# Patient Record
Sex: MALE | Race: OTHER
[De-identification: names, ages, dates, MRNs, and addresses within clinical notes are randomized per-mention and may not be internally consistent; named-entity substitution may affect disease eponyms.]

---

## 2018-03-13 ENCOUNTER — HOSPITAL ENCOUNTER (OUTPATIENT)
Dept: HOSPITAL 74 - FASU | Age: 73
Discharge: HOME | End: 2018-03-13
Attending: OPHTHALMOLOGY
Payer: COMMERCIAL

## 2018-03-13 VITALS — BODY MASS INDEX: 22.6 KG/M2

## 2018-03-13 DIAGNOSIS — H25.21: Primary | ICD-10-CM

## 2018-03-13 PROCEDURE — 08RJ3JZ REPLACEMENT OF RIGHT LENS WITH SYNTHETIC SUBSTITUTE, PERCUTANEOUS APPROACH: ICD-10-PCS | Performed by: OPHTHALMOLOGY

## 2018-03-13 RX ADMIN — KETOROLAC TROMETHAMINE ONE DROP: 5 SOLUTION OPHTHALMIC at 10:40

## 2018-03-13 RX ADMIN — KETOROLAC TROMETHAMINE ONE DROP: 5 SOLUTION OPHTHALMIC at 10:45

## 2018-03-13 RX ADMIN — TROPICAMIDE ONE DROP: 10 SOLUTION/ DROPS OPHTHALMIC at 10:40

## 2018-03-13 RX ADMIN — GENTAMICIN SULFATE ONE DROP: 3 SOLUTION/ DROPS OPHTHALMIC at 11:00

## 2018-03-13 RX ADMIN — KETOROLAC TROMETHAMINE ONE DROP: 5 SOLUTION OPHTHALMIC at 11:00

## 2018-03-13 RX ADMIN — KETOROLAC TROMETHAMINE ONE DROP: 5 SOLUTION OPHTHALMIC at 10:55

## 2018-03-13 RX ADMIN — CYCLOPENTOLATE HYDROCHLORIDE ONE DROP: 10 SOLUTION/ DROPS OPHTHALMIC at 11:00

## 2018-03-13 RX ADMIN — TROPICAMIDE ONE DROP: 10 SOLUTION/ DROPS OPHTHALMIC at 10:55

## 2018-03-13 RX ADMIN — KETOROLAC TROMETHAMINE ONE DROP: 5 SOLUTION OPHTHALMIC at 10:50

## 2018-03-13 RX ADMIN — CYCLOPENTOLATE HYDROCHLORIDE ONE DROP: 10 SOLUTION/ DROPS OPHTHALMIC at 10:40

## 2018-03-13 RX ADMIN — CYCLOPENTOLATE HYDROCHLORIDE ONE DROP: 10 SOLUTION/ DROPS OPHTHALMIC at 10:45

## 2018-03-13 RX ADMIN — TROPICAMIDE ONE DROP: 10 SOLUTION/ DROPS OPHTHALMIC at 10:50

## 2018-03-13 RX ADMIN — TROPICAMIDE ONE DROP: 10 SOLUTION/ DROPS OPHTHALMIC at 10:45

## 2018-03-13 RX ADMIN — GENTAMICIN SULFATE ONE DROP: 3 SOLUTION/ DROPS OPHTHALMIC at 10:55

## 2018-03-13 RX ADMIN — PHENYLEPHRINE HYDROCHLORIDE ONE DROP: 0.25 SPRAY NASAL at 10:40

## 2018-03-13 RX ADMIN — PHENYLEPHRINE HYDROCHLORIDE ONE DROP: 0.25 SPRAY NASAL at 10:50

## 2018-03-13 RX ADMIN — CYCLOPENTOLATE HYDROCHLORIDE ONE DROP: 10 SOLUTION/ DROPS OPHTHALMIC at 10:55

## 2018-03-13 RX ADMIN — CYCLOPENTOLATE HYDROCHLORIDE ONE DROP: 10 SOLUTION/ DROPS OPHTHALMIC at 10:50

## 2018-03-13 RX ADMIN — PHENYLEPHRINE HYDROCHLORIDE ONE DROP: 0.25 SPRAY NASAL at 10:55

## 2018-03-13 RX ADMIN — PHENYLEPHRINE HYDROCHLORIDE ONE DROP: 0.25 SPRAY NASAL at 10:45

## 2018-03-13 RX ADMIN — GENTAMICIN SULFATE ONE DROP: 3 SOLUTION/ DROPS OPHTHALMIC at 10:45

## 2018-03-13 RX ADMIN — TROPICAMIDE ONE DROP: 10 SOLUTION/ DROPS OPHTHALMIC at 11:00

## 2018-03-13 RX ADMIN — PHENYLEPHRINE HYDROCHLORIDE ONE DROP: 0.25 SPRAY NASAL at 11:00

## 2018-03-13 RX ADMIN — GENTAMICIN SULFATE ONE DROP: 3 SOLUTION/ DROPS OPHTHALMIC at 10:50

## 2018-03-13 RX ADMIN — GENTAMICIN SULFATE ONE DROP: 3 SOLUTION/ DROPS OPHTHALMIC at 10:40

## 2018-03-13 NOTE — OP
DATE OF OPERATION:  03/13/2018

 

PREOPERATIVE DIAGNOSIS:  Hypermature cataract, right eye.

 

POSTOPERATIVE DIAGNOSIS:  Hypermature cataract, right eye.

 

PROCEDURE:  Hypermature cataract extraction via phacoemulsification with insertion of

posterior chamber lens implant, right eye with use of trypan blue.

 

SURGEON:  Raul Hager MD

 

ASSISTANT:  Jaqueline Delarosa MD

 

ANESTHESIA:  Regional with sedation.

 

ESTIMATED BLOOD LOSS:  Less than 1 mL.

 

COMPLICATIONS:  None.

 

SPECIMENS:  None.

 

DESCRIPTION OF PROCEDURE:  The patient was identified in the holding area.  After all

risks, benefits, and alternatives were explained to the patient, informed consent was

obtained.  The right eye was marked with a marking pen.  The patient then entered the

operating room on an eye stretcher.  After a formal timeout was performed, a 3-mL

injection of equal parts of 2% lidocaine with epinephrine and 0.5% Marcaine was given

around the right eye.  The right eye was then prepped and draped in the usual sterile

fashion.  An eyelid speculum was placed beneath the eyelids of the right eye.  A

superotemporal paracentesis incision was created using a 15-degree blade. 

Viscoelastic was injected after an air bubble was injected first.  Trypan blue was

then used to stain the anterior capsule underneath the air bubble.  Then, as

previously mentioned, viscoelastic was injected afterward to fill the anterior

chamber and to clear any excess trypan blue from the eye.  A 2.4-mm keratome blade

was then used to make an inferotemporal incision.  A 360-degree, continuous

curvilinear capsulorrhexis was then created using bent cystotome and Utrata forceps. 

A light hydrodissection was performed using balanced saline solution on a cannula. 

Phacoemulsification was introduced to disassemble and remove the nucleus in its

entirety.  Irrigation/aspiration was used to remove any remaining cortical material

from the eye.  The capsular bag was reformed using viscoelastic.  An Gustavo model

[id number] with a power of 22.0 diopters, serial number [id number] was inspected and

found to be defect free and injected into the capsular bag.  Irrigation/aspiration

was used to remove any remaining viscoelastic from the eye.  The anterior chamber was

reformed using balanced saline solution.  Intracameral Miochol and Miostat were then

administered, and the pupil came down and was round.  All wounds were hydrated with

balanced saline solution and noted to be watertight.  For complete closure,

interrupted 10-0 nylon sutures were placed at the inferior and superior wounds, and

the knots buried.  Topical antibiotic eye drops and ointment were then administered

after it was verified that the anterior chamber was deep, the eye had an adequate

pressure, the lens was perfectly centered in the capsular bag, and there was a red

reflex present.  The eyelid speculum was removed from the right eye.  The right eye

was then patched and shielded.  The patient tolerated the procedure well and left the

operating room in stable condition to follow up in the eye clinic tomorrow morning at

9:00.

 

 

RAUL HAGER M.D.

 

[id number]

DD: 03/13/2018 13:48

DT: 03/13/2018 16:31

Job #:  [id number]

## 2018-03-14 VITALS — DIASTOLIC BLOOD PRESSURE: 72 MMHG | HEART RATE: 61 BPM | SYSTOLIC BLOOD PRESSURE: 120 MMHG | TEMPERATURE: 97.9 F

## 2018-11-29 ENCOUNTER — RX RENEWAL (OUTPATIENT)
Age: 73
End: 2018-11-29

## 2018-11-29 PROBLEM — Z00.00 ENCOUNTER FOR PREVENTIVE HEALTH EXAMINATION: Status: ACTIVE | Noted: 2018-11-29

## 2018-12-05 ENCOUNTER — RX RENEWAL (OUTPATIENT)
Age: 73
End: 2018-12-05

## 2018-12-07 ENCOUNTER — RX RENEWAL (OUTPATIENT)
Age: 73
End: 2018-12-07

## 2019-01-23 ENCOUNTER — RECORD ABSTRACTING (OUTPATIENT)
Age: 74
End: 2019-01-23

## 2019-01-23 DIAGNOSIS — Z82.49 FAMILY HISTORY OF ISCHEMIC HEART DISEASE AND OTHER DISEASES OF THE CIRCULATORY SYSTEM: ICD-10-CM

## 2019-01-23 DIAGNOSIS — Z01.818 ENCOUNTER FOR OTHER PREPROCEDURAL EXAMINATION: ICD-10-CM

## 2019-01-23 RX ORDER — METFORMIN HYDROCHLORIDE 500 MG/1
500 TABLET, COATED ORAL
Refills: 0 | Status: ACTIVE | COMMUNITY

## 2019-01-23 RX ORDER — CHROMIUM 200 MCG
TABLET ORAL
Refills: 0 | Status: ACTIVE | COMMUNITY

## 2019-01-23 RX ORDER — ASPIRIN 325 MG/1
TABLET, FILM COATED ORAL
Refills: 0 | Status: ACTIVE | COMMUNITY

## 2019-01-25 ENCOUNTER — APPOINTMENT (OUTPATIENT)
Dept: CARDIOLOGY | Facility: CLINIC | Age: 74
End: 2019-01-25
Payer: COMMERCIAL

## 2019-01-25 ENCOUNTER — NON-APPOINTMENT (OUTPATIENT)
Age: 74
End: 2019-01-25

## 2019-01-25 VITALS
HEIGHT: 68 IN | WEIGHT: 146 LBS | DIASTOLIC BLOOD PRESSURE: 78 MMHG | BODY MASS INDEX: 22.13 KG/M2 | SYSTOLIC BLOOD PRESSURE: 156 MMHG

## 2019-01-25 DIAGNOSIS — R94.39 ABNORMAL RESULT OF OTHER CARDIOVASCULAR FUNCTION STUDY: ICD-10-CM

## 2019-01-25 DIAGNOSIS — M79.7 FIBROMYALGIA: ICD-10-CM

## 2019-01-25 PROCEDURE — 93000 ELECTROCARDIOGRAM COMPLETE: CPT

## 2019-01-25 PROCEDURE — 99214 OFFICE O/P EST MOD 30 MIN: CPT

## 2019-01-25 NOTE — REASON FOR VISIT
[FreeTextEntry1] : The patient is followed in a preventive mode with the principle diagnoses of hypertension and hyperlipidemia.

## 2019-01-25 NOTE — DISCUSSION/SUMMARY
[FreeTextEntry1] : The patient is on a preventive mode with numerous coronary risk factors including hypertension hyperlipidemia family history of coronary disease. The patient's lipid profile has not been optimal. However we have made numerous attempts to try various statins all of which cause severe muscle pain. We discussed today the possibility of any nodule lipid-lowering agent one of the PCSk9 inhibitors. I explained that these drugs are extremely expensive and they might not yet be approved for primary prevention. I plan to [facility] with [facility] next week to see if the patient might qualify. I also recommended that the patient begin a program of daily exercise which has been lacking. We discussed the current medications that he is receiving. I believe these are all appropriate. These will be continued. The patient indicates that his blood pressure is usually very well controlled. Lab tests are done regularly by his primary care physician. However I have not see the recent report. This is being requested presently. Explained her over to the patient that the lipid profile is not likely to change dramatically absent some pharmacological intervention. The patient recently underwent cataract surgery and tolerated that procedure without difficulty. He was also evaluated by a retinologist; he states that there was no evidence of diabetic retinopathy. Patient's most recent stress test was done in April 2016 which revealed borderline ST segment depression at maximum exercise. I plan to repeat a stress echo at the time of the patient's next visit.

## 2019-01-25 NOTE — HISTORY OF PRESENT ILLNESS
[FreeTextEntry1] : The patient states that he is feeling entirely well he offers no cardiac symptoms.\par \par No symptoms of chest pain shortness of breath dizziness lightheadedness or palpitations. He states that his blood pressure is usually under excellent control. He states that his diabetes is under control.

## 2019-01-25 NOTE — PHYSICAL EXAM
[General Appearance - Well Developed] : well developed [Normal Appearance] : normal appearance [Well Groomed] : well groomed [General Appearance - Well Nourished] : well nourished [No Deformities] : no deformities [General Appearance - In No Acute Distress] : no acute distress [Normal Conjunctiva] : the conjunctiva exhibited no abnormalities [Eyelids - No Xanthelasma] : the eyelids demonstrated no xanthelasmas [Normal Oral Mucosa] : normal oral mucosa [No Oral Pallor] : no oral pallor [No Oral Cyanosis] : no oral cyanosis [Normal Jugular Venous A Waves Present] : normal jugular venous A waves present [Normal Jugular Venous V Waves Present] : normal jugular venous V waves present [No Jugular Venous Song A Waves] : no jugular venous song A waves [Respiration, Rhythm And Depth] : normal respiratory rhythm and effort [Exaggerated Use Of Accessory Muscles For Inspiration] : no accessory muscle use [Auscultation Breath Sounds / Voice Sounds] : lungs were clear to auscultation bilaterally [Heart Rate And Rhythm] : heart rate and rhythm were normal [Heart Sounds] : normal S1 and S2 [Murmurs] : no murmurs present [Abdomen Soft] : soft [Abdomen Tenderness] : non-tender [Abdomen Mass (___ Cm)] : no abdominal mass palpated [Abnormal Walk] : normal gait [Gait - Sufficient For Exercise Testing] : the gait was sufficient for exercise testing [Nail Clubbing] : no clubbing of the fingernails [Cyanosis, Localized] : no localized cyanosis [Petechial Hemorrhages (___cm)] : no petechial hemorrhages [Skin Color & Pigmentation] : normal skin color and pigmentation [] : no rash [No Venous Stasis] : no venous stasis [Skin Lesions] : no skin lesions [No Skin Ulcers] : no skin ulcer [No Xanthoma] : no  xanthoma was observed [Oriented To Time, Place, And Person] : oriented to person, place, and time [Affect] : the affect was normal [Mood] : the mood was normal [No Anxiety] : not feeling anxious [FreeTextEntry1] : Send a slightly built male

## 2019-02-18 ENCOUNTER — RX RENEWAL (OUTPATIENT)
Age: 74
End: 2019-02-18

## 2019-05-22 ENCOUNTER — RX CHANGE (OUTPATIENT)
Age: 74
End: 2019-05-22

## 2019-07-03 ENCOUNTER — RX RENEWAL (OUTPATIENT)
Age: 74
End: 2019-07-03

## 2019-08-13 ENCOUNTER — APPOINTMENT (OUTPATIENT)
Dept: CARDIOLOGY | Facility: CLINIC | Age: 74
End: 2019-08-13
Payer: COMMERCIAL

## 2019-08-13 VITALS
HEIGHT: 68 IN | OXYGEN SATURATION: 99 % | HEART RATE: 62 BPM | BODY MASS INDEX: 21.37 KG/M2 | SYSTOLIC BLOOD PRESSURE: 136 MMHG | WEIGHT: 141 LBS | DIASTOLIC BLOOD PRESSURE: 60 MMHG

## 2019-08-13 PROCEDURE — 36415 COLL VENOUS BLD VENIPUNCTURE: CPT

## 2019-08-13 PROCEDURE — 99213 OFFICE O/P EST LOW 20 MIN: CPT

## 2019-08-13 NOTE — HISTORY OF PRESENT ILLNESS
[FreeTextEntry1] : 73 year old male with HTN, hyperlipidemia, statin intolerance, family history of premature CAD, diabetes, fibromyalgia. \par \evelyn Recently started on Praluent that he self-administers. 1st dose given on July 8th. \par \evelyn Returns today for follow up visit. \par \evelyn Reports no issues with Praluent. Denies chest pain, SOB, palpitations or dizziness.

## 2019-08-13 NOTE — REVIEW OF SYSTEMS
[Fever] : no fever [Recent Weight Gain (___ Lbs)] : no recent weight gain [Headache] : no headache [Chills] : no chills [Recent Weight Loss (___ Lbs)] : no recent weight loss [Feeling Fatigued] : not feeling fatigued [Shortness Of Breath] : no shortness of breath [Chest  Pressure] : no chest pressure [Dyspnea on exertion] : not dyspnea during exertion [Lower Ext Edema] : no extremity edema [Chest Pain] : no chest pain [Palpitations] : no palpitations [Cough] : no cough [Abdominal Pain] : no abdominal pain [Muscle Cramps] : no muscle cramps [Skin: A Rash] : no rash: [Confusion] : no confusion was observed [Dizziness] : no dizziness [Excessive Thirst] : no polydipsia [Easy Bleeding] : no tendency for easy bleeding [Easy Bruising] : no tendency for easy bruising

## 2019-08-13 NOTE — DISCUSSION/SUMMARY
[FreeTextEntry3] : as scheduled, with Dr. Mckee [FreeTextEntry1] : \par Continue current medications. \par \par Continue Praluent.

## 2019-08-13 NOTE — ASSESSMENT
[FreeTextEntry1] : \par Statin intolerant. Now on PCSK9 inhibitor Praluent. Tolerating medication. Repeat lipids, chemistry and CPK drawn today.

## 2019-08-13 NOTE — PHYSICAL EXAM
[General Appearance - In No Acute Distress] : no acute distress [Well Groomed] : well groomed [Respiration, Rhythm And Depth] : normal respiratory rhythm and effort [Heart Rate And Rhythm] : heart rate and rhythm were normal [Auscultation Breath Sounds / Voice Sounds] : lungs were clear to auscultation bilaterally [Heart Sounds] : normal S1 and S2 [Murmurs] : no murmurs present [Edema] : no peripheral edema present [Abdomen Soft] : soft [Abnormal Walk] : normal gait [] : no rash [Oriented To Time, Place, And Person] : oriented to person, place, and time [Impaired Insight] : insight and judgment were intact [Affect] : the affect was normal

## 2019-08-14 LAB
ALBUMIN SERPL ELPH-MCNC: 4.4 G/DL
ALP BLD-CCNC: 107 U/L
ALT SERPL-CCNC: 15 U/L
ANION GAP SERPL CALC-SCNC: 11 MMOL/L
AST SERPL-CCNC: 19 U/L
BILIRUB SERPL-MCNC: 0.3 MG/DL
BUN SERPL-MCNC: 17 MG/DL
CALCIUM SERPL-MCNC: 9.3 MG/DL
CHLORIDE SERPL-SCNC: 102 MMOL/L
CHOLEST SERPL-MCNC: 83 MG/DL
CHOLEST/HDLC SERPL: 2.7 RATIO
CK SERPL-CCNC: 98 U/L
CO2 SERPL-SCNC: 28 MMOL/L
CREAT SERPL-MCNC: 0.92 MG/DL
GLUCOSE SERPL-MCNC: 152 MG/DL
HDLC SERPL-MCNC: 31 MG/DL
LDLC SERPL CALC-MCNC: 4 MG/DL
POTASSIUM SERPL-SCNC: 4.8 MMOL/L
PROT SERPL-MCNC: 6.8 G/DL
SODIUM SERPL-SCNC: 141 MMOL/L
TRIGL SERPL-MCNC: 239 MG/DL

## 2019-09-30 ENCOUNTER — RX RENEWAL (OUTPATIENT)
Age: 74
End: 2019-09-30

## 2019-12-16 ENCOUNTER — RX CHANGE (OUTPATIENT)
Age: 74
End: 2019-12-16

## 2019-12-16 RX ORDER — ALIROCUMAB 75 MG/ML
75 INJECTION, SOLUTION SUBCUTANEOUS
Qty: 1 | Refills: 5 | Status: DISCONTINUED | COMMUNITY
Start: 2019-12-16 | End: 2019-12-16

## 2019-12-16 RX ORDER — ALIROCUMAB 75 MG/ML
75 INJECTION, SOLUTION SUBCUTANEOUS
Qty: 2 | Refills: 5 | Status: DISCONTINUED | COMMUNITY
Start: 2019-05-22 | End: 2019-12-16

## 2019-12-18 ENCOUNTER — RX CHANGE (OUTPATIENT)
Age: 74
End: 2019-12-18

## 2020-02-12 RX ORDER — EVOLOCUMAB 140 MG/ML
140 INJECTION, SOLUTION SUBCUTANEOUS
Qty: 2 | Refills: 5 | Status: DISCONTINUED | COMMUNITY
Start: 2019-12-16 | End: 2020-02-12

## 2020-03-04 ENCOUNTER — RX RENEWAL (OUTPATIENT)
Age: 75
End: 2020-03-04

## 2020-03-30 ENCOUNTER — APPOINTMENT (OUTPATIENT)
Dept: CARDIOLOGY | Facility: CLINIC | Age: 75
End: 2020-03-30
Payer: COMMERCIAL

## 2020-03-30 PROCEDURE — 99443: CPT

## 2020-11-23 ENCOUNTER — RX RENEWAL (OUTPATIENT)
Age: 75
End: 2020-11-23

## 2020-12-31 RX ORDER — ALIROCUMAB 75 MG/ML
75 INJECTION, SOLUTION SUBCUTANEOUS
Qty: 1 | Refills: 5 | Status: DISCONTINUED | COMMUNITY
Start: 2020-02-12 | End: 2020-12-31

## 2021-03-01 ENCOUNTER — RX RENEWAL (OUTPATIENT)
Age: 76
End: 2021-03-01

## 2021-03-02 ENCOUNTER — APPOINTMENT (OUTPATIENT)
Dept: CARDIOLOGY | Facility: CLINIC | Age: 76
End: 2021-03-02
Payer: COMMERCIAL

## 2021-03-02 ENCOUNTER — NON-APPOINTMENT (OUTPATIENT)
Age: 76
End: 2021-03-02

## 2021-03-02 VITALS
HEIGHT: 67 IN | SYSTOLIC BLOOD PRESSURE: 130 MMHG | BODY MASS INDEX: 23.07 KG/M2 | TEMPERATURE: 97.7 F | WEIGHT: 147 LBS | DIASTOLIC BLOOD PRESSURE: 60 MMHG

## 2021-03-02 PROCEDURE — 93000 ELECTROCARDIOGRAM COMPLETE: CPT

## 2021-03-02 PROCEDURE — 99214 OFFICE O/P EST MOD 30 MIN: CPT

## 2021-03-02 PROCEDURE — 99072 ADDL SUPL MATRL&STAF TM PHE: CPT

## 2021-03-02 NOTE — HISTORY OF PRESENT ILLNESS
[FreeTextEntry1] : The patient has had no symptoms of chest pain palpitations or unusual shortness of breath. Exercise however has been  following a prudent diet limited in sweets and simple carbohydrates.

## 2021-03-02 NOTE — REASON FOR VISIT
[FreeTextEntry1] : The patient is followed in a preventive mode with several important coronary risk factors including diabetes mellitus, hyperlipidemia and family history. We have been using a PCS k9  inhibitor now for her over a year. Patient was not able to tolerate statins at all but has done well with these agents which have had a very salutary effect on the serum lipid levels. Fortunately he was approved for this drug by his insurance company.

## 2021-03-02 NOTE — PHYSICAL EXAM
[General Appearance - Well Developed] : well developed [Normal Appearance] : normal appearance [Well Groomed] : well groomed [General Appearance - Well Nourished] : well nourished [No Deformities] : no deformities [General Appearance - In No Acute Distress] : no acute distress [Normal Conjunctiva] : the conjunctiva exhibited no abnormalities [Eyelids - No Xanthelasma] : the eyelids demonstrated no xanthelasmas [Normal Oral Mucosa] : normal oral mucosa [No Oral Pallor] : no oral pallor [No Oral Cyanosis] : no oral cyanosis [Normal Jugular Venous A Waves Present] : normal jugular venous A waves present [Normal Jugular Venous V Waves Present] : normal jugular venous V waves present [No Jugular Venous Song A Waves] : no jugular venous song A waves [Respiration, Rhythm And Depth] : normal respiratory rhythm and effort [Exaggerated Use Of Accessory Muscles For Inspiration] : no accessory muscle use [Auscultation Breath Sounds / Voice Sounds] : lungs were clear to auscultation bilaterally [Heart Rate And Rhythm] : heart rate and rhythm were normal [Heart Sounds] : normal S1 and S2 [Murmurs] : no murmurs present [Abdomen Soft] : soft [Abdomen Tenderness] : non-tender [Abdomen Mass (___ Cm)] : no abdominal mass palpated [Abnormal Walk] : normal gait [Gait - Sufficient For Exercise Testing] : the gait was sufficient for exercise testing [Nail Clubbing] : no clubbing of the fingernails [Cyanosis, Localized] : no localized cyanosis [Petechial Hemorrhages (___cm)] : no petechial hemorrhages [Skin Color & Pigmentation] : normal skin color and pigmentation [] : no rash [No Venous Stasis] : no venous stasis [Skin Lesions] : no skin lesions [No Skin Ulcers] : no skin ulcer [No Xanthoma] : no  xanthoma was observed [Oriented To Time, Place, And Person] : oriented to person, place, and time [Affect] : the affect was normal [Mood] : the mood was normal [No Anxiety] : not feeling anxious

## 2021-03-02 NOTE — DISCUSSION/SUMMARY
[FreeTextEntry1] : Patient is doing very well clinically. He will continue on aggressive risk factor modification including a PCSk9 nhibitor. The patient is also on treatment for diabetes and hypertension. His examination is entirely stable and satisfactory blood pressure 130/60 cardiorespiratory examination normal electrocardiogram normal. I believe the patient is on optimal preventive medications at the present time. I did recommend that he increase his program of exercise and also recommended light weight llifting for improvement of muscle tone. Plan to do treadmill testing next time.

## 2021-07-27 ENCOUNTER — RX RENEWAL (OUTPATIENT)
Age: 76
End: 2021-07-27

## 2021-09-07 ENCOUNTER — APPOINTMENT (OUTPATIENT)
Dept: CARDIOLOGY | Facility: CLINIC | Age: 76
End: 2021-09-07

## 2021-10-25 ENCOUNTER — NON-APPOINTMENT (OUTPATIENT)
Age: 76
End: 2021-10-25

## 2021-10-25 ENCOUNTER — APPOINTMENT (OUTPATIENT)
Dept: CARDIOLOGY | Facility: CLINIC | Age: 76
End: 2021-10-25
Payer: COMMERCIAL

## 2021-10-25 VITALS
SYSTOLIC BLOOD PRESSURE: 140 MMHG | HEIGHT: 67 IN | BODY MASS INDEX: 24.48 KG/M2 | TEMPERATURE: 98.1 F | DIASTOLIC BLOOD PRESSURE: 60 MMHG | HEART RATE: 61 BPM | WEIGHT: 156 LBS

## 2021-10-25 DIAGNOSIS — H34.219 PARTIAL RETINAL ARTERY OCCLUSION, UNSPECIFIED EYE: ICD-10-CM

## 2021-10-25 PROCEDURE — 99212 OFFICE O/P EST SF 10 MIN: CPT | Mod: 25

## 2021-10-25 PROCEDURE — 93015 CV STRESS TEST SUPVJ I&R: CPT

## 2021-10-25 RX ORDER — TADALAFIL 5 MG/1
5 TABLET ORAL
Qty: 30 | Refills: 0 | Status: ACTIVE | COMMUNITY
Start: 2021-08-05

## 2021-10-25 NOTE — REASON FOR VISIT
[FreeTextEntry1] : Patient returns today for stress testing and clinical followup. He is followed on a preventive mode with significant coronary risk factors including genetic predisposition diabetes mellitus and hyperlipidemia. The patient was approved based on his risk factors for the PCS k9 inhibitors and has been receiving PRALUENT which will now be switched over to REPATHA. The patient has had no acute cardiac symptoms. There have been no symptoms of chest pain shortness of breath dizziness lightheadedness or palpitations.The patient was intolerant of statins.

## 2021-10-25 NOTE — DISCUSSION/SUMMARY
[FreeTextEntry1] : The patient has had no acute cardiac symptoms. His baseline EKG is normal. Today's treadmill stress test revealed no evidence of exercise-induced myocardial ischemia or arrhythmias. The patient will be treated with aggressive lipid lowering therapy. He will remain on his other medications 2. I am recommending that he maintain a program of regular exercise and dietary modifications.

## 2021-10-25 NOTE — PHYSICAL EXAM

## 2021-12-28 ENCOUNTER — RX CHANGE (OUTPATIENT)
Age: 76
End: 2021-12-28

## 2021-12-30 ENCOUNTER — RX CHANGE (OUTPATIENT)
Age: 76
End: 2021-12-30

## 2022-01-03 RX ORDER — EVOLOCUMAB 140 MG/ML
140 INJECTION, SOLUTION SUBCUTANEOUS
Qty: 2 | Refills: 5 | Status: COMPLETED | COMMUNITY
Start: 2020-12-31 | End: 2022-01-03

## 2022-02-14 ENCOUNTER — RX RENEWAL (OUTPATIENT)
Age: 77
End: 2022-02-14

## 2022-03-23 ENCOUNTER — RX CHANGE (OUTPATIENT)
Age: 77
End: 2022-03-23

## 2022-03-23 RX ORDER — EVOLOCUMAB 140 MG/ML
140 INJECTION, SOLUTION SUBCUTANEOUS
Qty: 2 | Refills: 5 | Status: DISCONTINUED | COMMUNITY
Start: 2021-12-28 | End: 2022-03-23

## 2022-04-20 ENCOUNTER — RX CHANGE (OUTPATIENT)
Age: 77
End: 2022-04-20

## 2022-04-20 RX ORDER — EVOLOCUMAB 140 MG/ML
140 INJECTION, SOLUTION SUBCUTANEOUS
Qty: 6 | Refills: 2 | Status: DISCONTINUED | COMMUNITY
Start: 2022-03-23 | End: 2022-04-20

## 2022-08-15 ENCOUNTER — RX RENEWAL (OUTPATIENT)
Age: 77
End: 2022-08-15

## 2023-01-20 ENCOUNTER — NON-APPOINTMENT (OUTPATIENT)
Age: 78
End: 2023-01-20

## 2023-01-20 ENCOUNTER — APPOINTMENT (OUTPATIENT)
Dept: CARDIOLOGY | Facility: CLINIC | Age: 78
End: 2023-01-20
Payer: COMMERCIAL

## 2023-01-20 VITALS
WEIGHT: 142 LBS | BODY MASS INDEX: 22.29 KG/M2 | HEART RATE: 66 BPM | HEIGHT: 67 IN | TEMPERATURE: 99 F | DIASTOLIC BLOOD PRESSURE: 62 MMHG | SYSTOLIC BLOOD PRESSURE: 136 MMHG

## 2023-01-20 DIAGNOSIS — Z82.49 FAMILY HISTORY OF ISCHEMIC HEART DISEASE AND OTHER DISEASES OF THE CIRCULATORY SYSTEM: ICD-10-CM

## 2023-01-20 PROCEDURE — 93000 ELECTROCARDIOGRAM COMPLETE: CPT

## 2023-01-20 PROCEDURE — 99214 OFFICE O/P EST MOD 30 MIN: CPT | Mod: 25

## 2023-01-20 NOTE — REASON FOR VISIT
[FreeTextEntry1] : Patient is followed in a preventive mode with the principal diagnoses of diabetes mellitus, hypertension, family history of coronary artery disease and hyperlipidemia\par \par We have been treating his blood pressure with lisinopril he receives metformin for hyperglycemia also amlodipine for hypertension and he is receiving monthly injections of PCSK9 inhibitor Repatha in order to better control hyperlipidemia.\par \par Patient is doing extremely well clinically.  There have been no symptoms of chest pain shortness of breath dizziness lightheadedness or palpitations.  The patient is very active and has lost some weight over the past year.

## 2023-01-20 NOTE — DISCUSSION/SUMMARY
[FreeTextEntry1] : Patient continues to do extremely well clinically.  We are focusing on primary prevention in this case.  I am very pleased with his progress.  We were able to get approval for the Repatha in spite of the fact that this is primary prevention.\par \par Cardiorespiratory examination is normal the electrocardiograph is normal the patient's blood pressure is normal\par \par Labs were done today by his primary care physician and those will be reviewed.  No changes will be made in his medications at the present time\par \par The patient will be embarking shortly on a trip to his native [facility] for 1 month.

## 2023-01-24 ENCOUNTER — NON-APPOINTMENT (OUTPATIENT)
Age: 78
End: 2023-01-24

## 2023-01-31 ENCOUNTER — RX RENEWAL (OUTPATIENT)
Age: 78
End: 2023-01-31

## 2023-02-28 ENCOUNTER — RX CHANGE (OUTPATIENT)
Age: 78
End: 2023-02-28

## 2023-02-28 RX ORDER — EVOLOCUMAB 140 MG/ML
140 INJECTION, SOLUTION SUBCUTANEOUS
Qty: 6 | Refills: 4 | Status: ACTIVE | COMMUNITY
Start: 2023-02-28 | End: 1900-01-01

## 2023-04-21 ENCOUNTER — RX RENEWAL (OUTPATIENT)
Age: 78
End: 2023-04-21

## 2023-05-04 ENCOUNTER — RX RENEWAL (OUTPATIENT)
Age: 78
End: 2023-05-04

## 2023-05-08 ENCOUNTER — RX RENEWAL (OUTPATIENT)
Age: 78
End: 2023-05-08

## 2023-07-24 ENCOUNTER — RX RENEWAL (OUTPATIENT)
Age: 78
End: 2023-07-24

## 2023-07-24 RX ORDER — LISINOPRIL 20 MG/1
20 TABLET ORAL
Qty: 180 | Refills: 3 | Status: ACTIVE | COMMUNITY
Start: 2018-11-29 | End: 1900-01-01

## 2023-07-25 ENCOUNTER — APPOINTMENT (OUTPATIENT)
Dept: CARDIOLOGY | Facility: CLINIC | Age: 78
End: 2023-07-25

## 2023-10-31 ENCOUNTER — NON-APPOINTMENT (OUTPATIENT)
Age: 78
End: 2023-10-31

## 2023-10-31 ENCOUNTER — APPOINTMENT (OUTPATIENT)
Dept: CARDIOLOGY | Facility: CLINIC | Age: 78
End: 2023-10-31
Payer: COMMERCIAL

## 2023-10-31 VITALS
BODY MASS INDEX: 21.66 KG/M2 | WEIGHT: 138 LBS | SYSTOLIC BLOOD PRESSURE: 144 MMHG | HEART RATE: 61 BPM | DIASTOLIC BLOOD PRESSURE: 62 MMHG | OXYGEN SATURATION: 100 % | HEIGHT: 67 IN

## 2023-10-31 PROCEDURE — 36415 COLL VENOUS BLD VENIPUNCTURE: CPT

## 2023-10-31 PROCEDURE — 93000 ELECTROCARDIOGRAM COMPLETE: CPT

## 2023-10-31 PROCEDURE — 99214 OFFICE O/P EST MOD 30 MIN: CPT | Mod: 25

## 2023-11-01 LAB
ALBUMIN SERPL ELPH-MCNC: 4.4 G/DL
ALP BLD-CCNC: 95 U/L
ALT SERPL-CCNC: 18 U/L
ANION GAP SERPL CALC-SCNC: 10 MMOL/L
AST SERPL-CCNC: 20 U/L
BASOPHILS # BLD AUTO: 0.07 K/UL
BASOPHILS NFR BLD AUTO: 0.9 %
BILIRUB SERPL-MCNC: 0.6 MG/DL
BUN SERPL-MCNC: 22 MG/DL
CALCIUM SERPL-MCNC: 9.4 MG/DL
CHLORIDE SERPL-SCNC: 105 MMOL/L
CHOLEST SERPL-MCNC: 151 MG/DL
CO2 SERPL-SCNC: 27 MMOL/L
CREAT SERPL-MCNC: 1.04 MG/DL
EGFR: 75 ML/MIN/1.73M2
EOSINOPHIL # BLD AUTO: 0.45 K/UL
EOSINOPHIL NFR BLD AUTO: 5.9 %
ESTIMATED AVERAGE GLUCOSE: 140 MG/DL
GLUCOSE SERPL-MCNC: 119 MG/DL
HBA1C MFR BLD HPLC: 6.5 %
HCT VFR BLD CALC: 45.9 %
HDLC SERPL-MCNC: 35 MG/DL
HGB BLD-MCNC: 14.4 G/DL
IMM GRANULOCYTES NFR BLD AUTO: 0.3 %
LDLC SERPL CALC-MCNC: 89 MG/DL
LYMPHOCYTES # BLD AUTO: 1.49 K/UL
LYMPHOCYTES NFR BLD AUTO: 19.6 %
MAN DIFF?: NORMAL
MCHC RBC-ENTMCNC: 28.9 PG
MCHC RBC-ENTMCNC: 31.4 GM/DL
MCV RBC AUTO: 92 FL
MONOCYTES # BLD AUTO: 0.65 K/UL
MONOCYTES NFR BLD AUTO: 8.5 %
NEUTROPHILS # BLD AUTO: 4.94 K/UL
NEUTROPHILS NFR BLD AUTO: 64.8 %
NONHDLC SERPL-MCNC: 115 MG/DL
PLATELET # BLD AUTO: 237 K/UL
POTASSIUM SERPL-SCNC: 5.5 MMOL/L
PROT SERPL-MCNC: 6.8 G/DL
RBC # BLD: 4.99 M/UL
RBC # FLD: 13.5 %
SODIUM SERPL-SCNC: 141 MMOL/L
TRIGL SERPL-MCNC: 149 MG/DL
WBC # FLD AUTO: 7.62 K/UL

## 2023-12-26 ENCOUNTER — RX RENEWAL (OUTPATIENT)
Age: 78
End: 2023-12-26

## 2024-02-02 ENCOUNTER — RX RENEWAL (OUTPATIENT)
Age: 79
End: 2024-02-02

## 2024-02-02 RX ORDER — AMLODIPINE BESYLATE 5 MG/1
5 TABLET ORAL
Qty: 90 | Refills: 1 | Status: ACTIVE | COMMUNITY
Start: 2018-12-05 | End: 1900-01-01

## 2024-05-31 RX ORDER — EVOLOCUMAB 140 MG/ML
140 INJECTION, SOLUTION SUBCUTANEOUS
Qty: 6 | Refills: 3 | Status: ACTIVE | COMMUNITY
Start: 2022-04-20 | End: 1900-01-01

## 2024-06-28 ENCOUNTER — NON-APPOINTMENT (OUTPATIENT)
Age: 79
End: 2024-06-28

## 2024-06-28 ENCOUNTER — APPOINTMENT (OUTPATIENT)
Dept: HEART AND VASCULAR | Facility: CLINIC | Age: 79
End: 2024-06-28
Payer: COMMERCIAL

## 2024-06-28 VITALS
BODY MASS INDEX: 22.29 KG/M2 | WEIGHT: 142 LBS | SYSTOLIC BLOOD PRESSURE: 128 MMHG | DIASTOLIC BLOOD PRESSURE: 60 MMHG | HEART RATE: 67 BPM | OXYGEN SATURATION: 96 % | HEIGHT: 67 IN

## 2024-06-28 DIAGNOSIS — E11.9 TYPE 2 DIABETES MELLITUS W/OUT COMPLICATIONS: ICD-10-CM

## 2024-06-28 DIAGNOSIS — E78.5 HYPERLIPIDEMIA, UNSPECIFIED: ICD-10-CM

## 2024-06-28 DIAGNOSIS — I73.9 PERIPHERAL VASCULAR DISEASE, UNSPECIFIED: ICD-10-CM

## 2024-06-28 DIAGNOSIS — I10 ESSENTIAL (PRIMARY) HYPERTENSION: ICD-10-CM

## 2024-06-28 PROCEDURE — 93000 ELECTROCARDIOGRAM COMPLETE: CPT

## 2024-06-28 PROCEDURE — 99214 OFFICE O/P EST MOD 30 MIN: CPT | Mod: 25

## 2024-07-23 ENCOUNTER — RESULT REVIEW (OUTPATIENT)
Age: 79
End: 2024-07-23

## 2024-08-19 ENCOUNTER — RX RENEWAL (OUTPATIENT)
Age: 79
End: 2024-08-19

## 2024-09-19 ENCOUNTER — NON-APPOINTMENT (OUTPATIENT)
Age: 79
End: 2024-09-19

## 2024-09-20 ENCOUNTER — APPOINTMENT (OUTPATIENT)
Dept: HEART AND VASCULAR | Facility: CLINIC | Age: 79
End: 2024-09-20
Payer: COMMERCIAL

## 2024-09-20 ENCOUNTER — NON-APPOINTMENT (OUTPATIENT)
Age: 79
End: 2024-09-20

## 2024-09-20 VITALS
OXYGEN SATURATION: 99 % | SYSTOLIC BLOOD PRESSURE: 126 MMHG | BODY MASS INDEX: 22.17 KG/M2 | HEART RATE: 62 BPM | WEIGHT: 141.25 LBS | HEIGHT: 67 IN | DIASTOLIC BLOOD PRESSURE: 55 MMHG

## 2024-09-20 DIAGNOSIS — I73.9 PERIPHERAL VASCULAR DISEASE, UNSPECIFIED: ICD-10-CM

## 2024-09-20 DIAGNOSIS — E11.9 TYPE 2 DIABETES MELLITUS W/OUT COMPLICATIONS: ICD-10-CM

## 2024-09-20 DIAGNOSIS — E78.5 HYPERLIPIDEMIA, UNSPECIFIED: ICD-10-CM

## 2024-09-20 DIAGNOSIS — I10 ESSENTIAL (PRIMARY) HYPERTENSION: ICD-10-CM

## 2024-09-20 PROCEDURE — G2211 COMPLEX E/M VISIT ADD ON: CPT | Mod: NC

## 2024-09-20 PROCEDURE — 99214 OFFICE O/P EST MOD 30 MIN: CPT | Mod: 25

## 2024-09-20 PROCEDURE — 93000 ELECTROCARDIOGRAM COMPLETE: CPT

## 2024-09-20 NOTE — HISTORY OF PRESENT ILLNESS
[FreeTextEntry1] : 77 y/o M patient of Dr. Mckee here today to transition care. Hx of DM, dyslipidemia, and HTN. The patient has been on Repatha for primary prevention due to dyslipidemia and inability to tolerate statins.   Walks a lot, [place] city, no chest pain or sob.  Has some calf/thigh pain when he walks a lot of steps.  No dizziness/palps/swelling

## 2024-09-20 NOTE — CARDIOLOGY SUMMARY
[de-identified] : Echo 7/23/24: 1. Left ventricular cavity is small. Left ventricular wall thickness is normal. Left ventricular systolic function is normal with an ejection fraction of 69 % by Montano's method of disks. There are no regional wall motion abnormalities seen. 2. Normal left ventricular diastolic function, with normal left ventricular filling pressure. 3. Normal right ventricular cavity size, with normal wall thickness, and normal right ventricular systolic function. 4. The left atrium is mildly dilated. 5. No significant valvular disease. 6. Mild tricuspid regurgitation. 7. Estimated pulmonary artery systolic pressure is 31 mmHg, consistent with normal pulmonary artery pressure. 8. No pericardial effusion seen. [de-identified] : Exercise Stress Test 10/25/21: GUILLERMO for 8:10min;s, Max. METS: 8.80. 91% maphr. HR and BP appropriate response to exercise. No evidence of exercise induced ischemia to attained HR. [de-identified] : ILATERAL ANKLE-BRACHIAL INDEX, SEGMENTAL LIMB PRESSURES, AND PULSE VOLUME RECORDING 7/23/24: 1. Right: normal SHERYL. 2. Left: SHERYL indicates borderline lower extremity arterial disease. SHERYL                                                         TBI  --------------------------------- ------------------------  ------------------ Non-compressible (>1.4)           Mild disease (0.70 -      Normal (> 0.7) 0.90) Normal at rest (1.0 - 1.4)        Moderate (0.40 - 0.69)    Mild (0.5-0.7) Borderline abnormal at rest (0.91 Severe disease, critical  Moderate - 0.99)                           PAD (< 0.4 or less)       (0.35-0.5) Severe (< 0.35) -------------------------------------------------------------------------------- FINDINGS: Right Lower Extremity Arteries: Resting right ankle-brachial index is within normal range. No evidence of significant right lower extremity arterial disease. Left Lower Extremity Arteries: Resting right ankle-brachial index indicates borderline left lower extremity arterial disease.

## 2024-09-20 NOTE — ASSESSMENT
[FreeTextEntry1] : 78 M   Claudication Venous HTN  HLD  HTN - LDL 93  EKG sinus Exercise Stress Test 10/25/21: GUILLERMO for 8:10min;s, Max. METS: 8.80. 91% mphr. HR and BP appropriate response to exercise. No evidence of exercise induced ischemia to attained HR.  Prior [facility] records/imaging reviewed.  - leg cramping improved with compression therapy, likely venous HTN cause of symptoms - L SHERYL 0.92 is borderline reduced.  Rec LE art US to confirm anatomy given his need for daily compression therapy.  - TTE July 2024 - unremarkable - BP controlled - continue norvasc 5mg, lisinopril 20mg daily - continue repatha, he has been taking 140mg once a month.  LDL is 93.  Advised taking it q2 weeks as is recommended.  Intolerant of statins.   Repeat lipids in 3 months.   - continue aspirin, primary prevention.  No hx VTE, CVA, or MI.   - obtain treadmill stress echo given risk profile

## 2024-10-15 ENCOUNTER — APPOINTMENT (OUTPATIENT)
Dept: CARDIOLOGY | Facility: CLINIC | Age: 79
End: 2024-10-15
Payer: COMMERCIAL

## 2024-10-15 PROCEDURE — ZZZZZ: CPT

## 2024-10-15 PROCEDURE — 93351 STRESS TTE COMPLETE: CPT

## 2024-10-31 ENCOUNTER — APPOINTMENT (OUTPATIENT)
Dept: CARDIOLOGY | Facility: CLINIC | Age: 79
End: 2024-10-31

## 2024-11-07 ENCOUNTER — APPOINTMENT (OUTPATIENT)
Dept: CARDIOLOGY | Facility: CLINIC | Age: 79
End: 2024-11-07

## 2025-02-18 ENCOUNTER — NON-APPOINTMENT (OUTPATIENT)
Age: 80
End: 2025-02-18

## 2025-03-10 ENCOUNTER — APPOINTMENT (OUTPATIENT)
Dept: FAMILY MEDICINE | Facility: CLINIC | Age: 80
End: 2025-03-10

## 2025-03-13 ENCOUNTER — APPOINTMENT (OUTPATIENT)
Dept: CARDIOLOGY | Facility: CLINIC | Age: 80
End: 2025-03-13
Payer: COMMERCIAL

## 2025-03-13 ENCOUNTER — APPOINTMENT (OUTPATIENT)
Dept: HEART AND VASCULAR | Facility: CLINIC | Age: 80
End: 2025-03-13
Payer: COMMERCIAL

## 2025-03-13 VITALS
WEIGHT: 132 LBS | HEIGHT: 67 IN | SYSTOLIC BLOOD PRESSURE: 140 MMHG | BODY MASS INDEX: 20.72 KG/M2 | DIASTOLIC BLOOD PRESSURE: 62 MMHG

## 2025-03-13 DIAGNOSIS — E11.9 TYPE 2 DIABETES MELLITUS W/OUT COMPLICATIONS: ICD-10-CM

## 2025-03-13 DIAGNOSIS — R55 SYNCOPE AND COLLAPSE: ICD-10-CM

## 2025-03-13 DIAGNOSIS — I10 ESSENTIAL (PRIMARY) HYPERTENSION: ICD-10-CM

## 2025-03-13 DIAGNOSIS — E78.5 HYPERLIPIDEMIA, UNSPECIFIED: ICD-10-CM

## 2025-03-13 DIAGNOSIS — I73.9 PERIPHERAL VASCULAR DISEASE, UNSPECIFIED: ICD-10-CM

## 2025-03-13 PROCEDURE — 93246 EXT ECG>7D<15D RECORDING: CPT

## 2025-03-13 PROCEDURE — 93000 ELECTROCARDIOGRAM COMPLETE: CPT

## 2025-03-13 PROCEDURE — 99214 OFFICE O/P EST MOD 30 MIN: CPT

## 2025-03-13 PROCEDURE — 93925 LOWER EXTREMITY STUDY: CPT

## 2025-03-13 PROCEDURE — G2211 COMPLEX E/M VISIT ADD ON: CPT | Mod: NC

## 2025-03-13 PROCEDURE — 93000 ELECTROCARDIOGRAM COMPLETE: CPT | Mod: 59

## 2025-03-14 PROBLEM — R55 SYNCOPE: Status: ACTIVE | Noted: 2025-03-14

## 2025-03-14 PROBLEM — R55 SYNCOPE AND COLLAPSE: Status: ACTIVE | Noted: 2025-03-14

## 2025-03-21 ENCOUNTER — NON-APPOINTMENT (OUTPATIENT)
Age: 80
End: 2025-03-21

## 2025-04-02 ENCOUNTER — APPOINTMENT (OUTPATIENT)
Dept: GASTROENTEROLOGY | Facility: CLINIC | Age: 80
End: 2025-04-02

## 2025-04-11 PROCEDURE — 93248 EXT ECG>7D<15D REV&INTERPJ: CPT

## 2025-04-15 RX ORDER — AMLODIPINE BESYLATE 10 MG/1
10 TABLET ORAL DAILY
Qty: 90 | Refills: 3 | Status: ACTIVE | COMMUNITY
Start: 2025-04-14 | End: 1900-01-01

## 2025-04-22 ENCOUNTER — NON-APPOINTMENT (OUTPATIENT)
Age: 80
End: 2025-04-22

## 2025-04-22 ENCOUNTER — LABORATORY RESULT (OUTPATIENT)
Age: 80
End: 2025-04-22

## 2025-04-22 ENCOUNTER — APPOINTMENT (OUTPATIENT)
Dept: HEART AND VASCULAR | Facility: CLINIC | Age: 80
End: 2025-04-22

## 2025-04-22 VITALS
SYSTOLIC BLOOD PRESSURE: 130 MMHG | OXYGEN SATURATION: 100 % | BODY MASS INDEX: 20.83 KG/M2 | DIASTOLIC BLOOD PRESSURE: 60 MMHG | WEIGHT: 133 LBS | HEART RATE: 60 BPM

## 2025-04-22 DIAGNOSIS — I10 ESSENTIAL (PRIMARY) HYPERTENSION: ICD-10-CM

## 2025-04-22 DIAGNOSIS — R55 SYNCOPE AND COLLAPSE: ICD-10-CM

## 2025-04-22 DIAGNOSIS — I73.9 PERIPHERAL VASCULAR DISEASE, UNSPECIFIED: ICD-10-CM

## 2025-04-22 DIAGNOSIS — E78.5 HYPERLIPIDEMIA, UNSPECIFIED: ICD-10-CM

## 2025-04-22 PROCEDURE — 93000 ELECTROCARDIOGRAM COMPLETE: CPT

## 2025-04-22 PROCEDURE — 99214 OFFICE O/P EST MOD 30 MIN: CPT

## 2025-04-22 PROCEDURE — G2211 COMPLEX E/M VISIT ADD ON: CPT | Mod: NC

## 2025-04-23 LAB
ANION GAP SERPL CALC-SCNC: 19 MMOL/L
BUN SERPL-MCNC: 18 MG/DL
CALCIUM SERPL-MCNC: 9.3 MG/DL
CHLORIDE SERPL-SCNC: 99 MMOL/L
CO2 SERPL-SCNC: 20 MMOL/L
CREAT SERPL-MCNC: 1.07 MG/DL
EGFRCR SERPLBLD CKD-EPI 2021: 71 ML/MIN/1.73M2
GLUCOSE SERPL-MCNC: 150 MG/DL
POTASSIUM SERPL-SCNC: 4.7 MMOL/L
SODIUM SERPL-SCNC: 138 MMOL/L

## 2025-04-24 ENCOUNTER — APPOINTMENT (OUTPATIENT)
Dept: FAMILY MEDICINE | Facility: CLINIC | Age: 80
End: 2025-04-24

## 2025-06-10 ENCOUNTER — APPOINTMENT (OUTPATIENT)
Dept: HEART AND VASCULAR | Facility: CLINIC | Age: 80
End: 2025-06-10
Payer: COMMERCIAL

## 2025-06-10 VITALS
OXYGEN SATURATION: 100 % | DIASTOLIC BLOOD PRESSURE: 58 MMHG | HEART RATE: 57 BPM | SYSTOLIC BLOOD PRESSURE: 126 MMHG | BODY MASS INDEX: 20.83 KG/M2 | WEIGHT: 133 LBS

## 2025-06-10 PROCEDURE — 93000 ELECTROCARDIOGRAM COMPLETE: CPT

## 2025-06-10 PROCEDURE — 99214 OFFICE O/P EST MOD 30 MIN: CPT

## 2025-06-10 PROCEDURE — G2211 COMPLEX E/M VISIT ADD ON: CPT | Mod: NC

## 2025-06-30 ENCOUNTER — APPOINTMENT (OUTPATIENT)
Dept: ENDOCRINOLOGY | Facility: CLINIC | Age: 80
End: 2025-06-30
Payer: COMMERCIAL

## 2025-06-30 VITALS
HEIGHT: 67 IN | OXYGEN SATURATION: 99 % | DIASTOLIC BLOOD PRESSURE: 68 MMHG | HEART RATE: 60 BPM | SYSTOLIC BLOOD PRESSURE: 136 MMHG

## 2025-06-30 LAB — GLUCOSE BLDC GLUCOMTR-MCNC: 144

## 2025-06-30 PROCEDURE — 99204 OFFICE O/P NEW MOD 45 MIN: CPT

## 2025-06-30 PROCEDURE — 82962 GLUCOSE BLOOD TEST: CPT
